# Patient Record
Sex: FEMALE | Race: WHITE | NOT HISPANIC OR LATINO | Employment: OTHER | URBAN - METROPOLITAN AREA
[De-identification: names, ages, dates, MRNs, and addresses within clinical notes are randomized per-mention and may not be internally consistent; named-entity substitution may affect disease eponyms.]

---

## 2017-01-16 ENCOUNTER — TELEPHONE (OUTPATIENT)
Dept: NEUROLOGY | Facility: CLINIC | Age: 77
End: 2017-01-16

## 2017-01-16 NOTE — TELEPHONE ENCOUNTER
----- Message from Mariana Fish sent at 1/16/2017  4:15 PM CST -----  Contact: Holley (Bestfriend) [phone]   Pt is calling to schedule recall appt, pls call

## 2017-01-17 NOTE — TELEPHONE ENCOUNTER
Called and spoke to patient son about setting up a follow up appt to see . We agreed on March 30 Thursday at 2:00 PM

## 2017-10-13 RX ORDER — ROPINIROLE 0.5 MG/1
TABLET, FILM COATED ORAL
Qty: 270 TABLET | Refills: 3 | Status: SHIPPED | OUTPATIENT
Start: 2017-10-13 | End: 2018-02-26 | Stop reason: SDUPTHER

## 2018-02-22 ENCOUNTER — PATIENT MESSAGE (OUTPATIENT)
Dept: NEUROLOGY | Facility: CLINIC | Age: 78
End: 2018-02-22

## 2018-02-26 RX ORDER — ROPINIROLE 0.5 MG/1
0.75 TABLET, FILM COATED ORAL 3 TIMES DAILY
Qty: 405 TABLET | Refills: 3 | Status: SHIPPED | OUTPATIENT
Start: 2018-02-26 | End: 2018-05-23 | Stop reason: SDUPTHER

## 2018-05-23 ENCOUNTER — OFFICE VISIT (OUTPATIENT)
Dept: NEUROLOGY | Facility: CLINIC | Age: 78
End: 2018-05-23
Payer: MEDICARE

## 2018-05-23 VITALS — DIASTOLIC BLOOD PRESSURE: 92 MMHG | HEIGHT: 66 IN | SYSTOLIC BLOOD PRESSURE: 198 MMHG | HEART RATE: 58 BPM

## 2018-05-23 DIAGNOSIS — G25.81 RLS (RESTLESS LEGS SYNDROME): Primary | ICD-10-CM

## 2018-05-23 DIAGNOSIS — I69.320 APHASIA DUE TO OLD CEREBRAL INFARCTION: ICD-10-CM

## 2018-05-23 DIAGNOSIS — G81.11 RIGHT SPASTIC HEMIPARESIS: ICD-10-CM

## 2018-05-23 PROCEDURE — 99214 OFFICE O/P EST MOD 30 MIN: CPT | Mod: S$PBB,,, | Performed by: PSYCHIATRY & NEUROLOGY

## 2018-05-23 PROCEDURE — 99213 OFFICE O/P EST LOW 20 MIN: CPT | Mod: PBBFAC | Performed by: PSYCHIATRY & NEUROLOGY

## 2018-05-23 PROCEDURE — 99999 PR PBB SHADOW E&M-EST. PATIENT-LVL III: CPT | Mod: PBBFAC,,, | Performed by: PSYCHIATRY & NEUROLOGY

## 2018-05-23 RX ORDER — ROPINIROLE 0.5 MG/1
1 TABLET, FILM COATED ORAL 4 TIMES DAILY
Qty: 360 TABLET | Refills: 3 | Status: SHIPPED | OUTPATIENT
Start: 2018-05-23 | End: 2018-12-06 | Stop reason: SDUPTHER
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
## 2018-05-23 NOTE — LETTER
May 25, 2018      Marisel Dobson MD  [facility] [address] [address]           [place] [address]  [place] [address]  Phone: [phone]  Fax: [phone]          Patient: Adelia Recinos   MR Number: [id number]   YOB: 1940   Date of Visit: 5/23/2018       Dear Dr. Marisel Dobson:    Thank you for referring Adelia Recinos to me for evaluation. Attached you will find relevant portions of my assessment and plan of care.    If you have questions, please do not hesitate to call me. I look forward to following Adelia Recinos along with you.    Sincerely,    Michelle Truong MD    Enclosure  CC:  No Recipients    If you would like to receive this communication electronically, please contact [email or web address] or [phone] to request more information on [employer] access.    For providers and/or their staff who would like to refer a patient to [facility], please contact us through our one-stop-shop provider referral line, [facility], at [phone].    If you feel you have received this communication in error or would no longer like to receive these types of communications, please e-mail [email or web address]

## 2018-05-25 NOTE — ASSESSMENT & PLAN NOTE
Slight worsening in symptoms since last seen, thus advised to increase requip.  Warned that this could cause hallucinations.

## 2018-10-12 ENCOUNTER — OFFICE VISIT (OUTPATIENT)
Dept: NEUROLOGY | Facility: CLINIC | Age: 78
End: 2018-10-12
Payer: MEDICARE

## 2018-10-12 VITALS
BODY MASS INDEX: 19.96 KG/M2 | DIASTOLIC BLOOD PRESSURE: 70 MMHG | HEIGHT: 66 IN | HEART RATE: 59 BPM | SYSTOLIC BLOOD PRESSURE: 152 MMHG

## 2018-10-12 DIAGNOSIS — H53.9 VISION CHANGES: ICD-10-CM

## 2018-10-12 DIAGNOSIS — R41.3 MEMORY LOSS: ICD-10-CM

## 2018-10-12 DIAGNOSIS — I69.320 APHASIA DUE TO OLD CEREBRAL INFARCTION: Primary | ICD-10-CM

## 2018-10-12 PROCEDURE — 99213 OFFICE O/P EST LOW 20 MIN: CPT | Mod: PBBFAC | Performed by: PSYCHIATRY & NEUROLOGY

## 2018-10-12 PROCEDURE — 99214 OFFICE O/P EST MOD 30 MIN: CPT | Mod: S$PBB,,, | Performed by: PSYCHIATRY & NEUROLOGY

## 2018-10-12 PROCEDURE — 99999 PR PBB SHADOW E&M-EST. PATIENT-LVL III: CPT | Mod: PBBFAC,,, | Performed by: PSYCHIATRY & NEUROLOGY

## 2018-10-12 NOTE — ASSESSMENT & PLAN NOTE
I'm unable to appreciate worsening in language, but tend to believe Holley Polanco over Adelia.   -> ST

## 2018-10-12 NOTE — LETTER
October 12, 2018      Marisel Dobson MD  [facility] [address] [address]           [place] [address]  [place] [address]  Phone: [phone]  Fax: [phone]          Patient: Adleia Recinos   MR Number: [id number]   YOB: 1940   Date of Visit: 10/12/2018       Dear Dr. Marisel Dobson:    Thank you for referring Adelia Recinos to me for evaluation. Attached you will find relevant portions of my assessment and plan of care.    If you have questions, please do not hesitate to call me. I look forward to following Adelia Recinos along with you.    Sincerely,    Michelle Truong MD    Enclosure  CC:  No Recipients    If you would like to receive this communication electronically, please contact [email or web address] or [phone] to request more information on [employer] Link access.    For providers and/or their staff who would like to refer a patient to [facility], please contact us through our one-stop-shop provider referral line, [facility], at [phone].    If you feel you have received this communication in error or would no longer like to receive these types of communications, please e-mail [email or web address]
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
## 2018-10-12 NOTE — ASSESSMENT & PLAN NOTE
Unable to perform MOCA due to aphasia and hemiparesis, but she endorses some loss of memory.   -> check b12, b1, tsh

## 2018-10-19 ENCOUNTER — PATIENT MESSAGE (OUTPATIENT)
Dept: NEUROLOGY | Facility: CLINIC | Age: 78
End: 2018-10-19

## 2018-10-23 ENCOUNTER — DOCUMENTATION ONLY (OUTPATIENT)
Dept: REHABILITATION | Facility: HOSPITAL | Age: 78
End: 2018-10-23

## 2018-10-23 NOTE — PROGRESS NOTES
No Show Note/Documentation    Patient: Adelia Recinos  Date of Session: 10/23/2018  MRN: [id number]    Adelia Recinos did not attend her  Scheduled speech and language evaluation today. She did not call to cancel nor reschedule. This is the 1st appointment that she has not attended. Will attempt to reschedule. No charges have been posted today.     ROSSANA Field, CCC-SLP  Speech Language Pathologist  10/23/2018

## 2018-10-26 ENCOUNTER — PATIENT MESSAGE (OUTPATIENT)
Dept: NEUROLOGY | Facility: CLINIC | Age: 78
End: 2018-10-26

## 2018-12-06 DIAGNOSIS — G25.81 RLS (RESTLESS LEGS SYNDROME): ICD-10-CM

## 2018-12-06 RX ORDER — ROPINIROLE 0.5 MG/1
1 TABLET, FILM COATED ORAL 4 TIMES DAILY
Qty: 360 TABLET | Refills: 3 | Status: SHIPPED | OUTPATIENT
Start: 2018-12-06 | End: 2019-10-17 | Stop reason: SDUPTHER

## 2019-03-14 ENCOUNTER — PATIENT MESSAGE (OUTPATIENT)
Dept: NEUROLOGY | Facility: CLINIC | Age: 79
End: 2019-03-14

## 2019-10-17 DIAGNOSIS — G25.81 RLS (RESTLESS LEGS SYNDROME): ICD-10-CM

## 2019-10-18 RX ORDER — ROPINIROLE 0.5 MG/1
1 TABLET, FILM COATED ORAL 4 TIMES DAILY
Qty: 360 TABLET | Refills: 3 | Status: SHIPPED | OUTPATIENT
Start: 2019-10-18 | End: 2020-05-12 | Stop reason: ALTCHOICE

## 2020-04-29 ENCOUNTER — PATIENT MESSAGE (OUTPATIENT)
Dept: NEUROLOGY | Facility: CLINIC | Age: 80
End: 2020-04-29

## 2020-05-12 ENCOUNTER — PATIENT MESSAGE (OUTPATIENT)
Dept: NEUROLOGY | Facility: CLINIC | Age: 80
End: 2020-05-12

## 2020-05-12 DIAGNOSIS — G81.11 RIGHT SPASTIC HEMIPARESIS: Primary | ICD-10-CM

## 2020-05-12 DIAGNOSIS — G25.81 RLS (RESTLESS LEGS SYNDROME): ICD-10-CM

## 2020-05-12 DIAGNOSIS — I69.320 APHASIA DUE TO OLD CEREBRAL INFARCTION: ICD-10-CM

## 2020-05-12 RX ORDER — DIAZEPAM ORAL 5 MG/5ML
1-2 SOLUTION ORAL EVERY 8 HOURS PRN
Qty: 180 ML | Refills: 5 | Status: SHIPPED | OUTPATIENT
Start: 2020-05-12 | End: 2020-06-11

## 2020-05-27 ENCOUNTER — PATIENT MESSAGE (OUTPATIENT)
Dept: NEUROLOGY | Facility: CLINIC | Age: 80
End: 2020-05-27

## 2020-06-12 ENCOUNTER — OFFICE VISIT (OUTPATIENT)
Dept: NEUROLOGY | Facility: CLINIC | Age: 80
End: 2020-06-12
Payer: MEDICARE

## 2020-06-12 DIAGNOSIS — G81.11 RIGHT SPASTIC HEMIPARESIS: ICD-10-CM

## 2020-06-12 DIAGNOSIS — I69.320 APHASIA DUE TO OLD CEREBRAL INFARCTION: Primary | ICD-10-CM

## 2020-06-12 PROCEDURE — 99213 OFFICE O/P EST LOW 20 MIN: CPT | Mod: 95,,, | Performed by: PSYCHIATRY & NEUROLOGY

## 2020-06-12 PROCEDURE — 99213 PR OFFICE/OUTPT VISIT, EST, LEVL III, 20-29 MIN: ICD-10-PCS | Mod: 95,,, | Performed by: PSYCHIATRY & NEUROLOGY

## 2020-06-12 NOTE — PROGRESS NOTES
Established Patient - Audio Only Telehealth Visit     The patient location is: Home  The chief complaint leading to consultation is: aphasia, rls, decline  Visit type: Virtual visit with audio only (telephone)  Total time spent with patient: 10 min       The reason for the audio only service rather than synchronous audio and video virtual visit was related to technical difficulties or patient preference/necessity.     Each patient to whom I provide medical services by telemedicine is:  (1) informed of the relationship between the physician and patient and the respective role of any other health care provider with respect to management of the patient; and (2) notified that they may decline to receive medical services by telemedicine and may withdraw from such care at any time. Patient verbally consented to receive this service via voice-only telephone call.       HPI:   Spoke with son, Dominic.  Health is declining, but pcp didn't feel like it was time for hospice.  She is slower, not talking as well.  She has 24hr sitters and care.  Unable to walk, but can transfer with assistance.  Needs help with feeding herself.  Intermittently has a day of moderate sedation.  May have had covid (son had it) about 2-3 months ago.  Not tested.    Does not seem to have discomfort.  RLS controlled.    Patient does not want life support or aggressive care.       Assessment and plan:    She was really ill a month ago and seemed to be actively dying, but she has stabilized on a new low functioning level.  It's possible she had stroke, but I suspect more likely that she had some sort of URI (covid or not) and has not been able to recover.    Discussed hospice with son, but he agrees that it would not be helpful or necessary at this time.                        This service was not originating from a related E/M service provided within the previous 7 days nor will  to an E/M service or procedure within the next 24 hours or my  soonest available appointment.  Prevailing standard of care was able to be met in this audio-only visit.

## 2020-07-01 ENCOUNTER — TELEPHONE (OUTPATIENT)
Dept: NEUROLOGY | Facility: CLINIC | Age: 80
End: 2020-07-01

## 2020-07-01 NOTE — TELEPHONE ENCOUNTER
Called and spoke to Dominic regarding his mother's appt with .    Rescheduled for July 9 Wednesday at 10:00AM   Vaccine Information Statement(s) was given today. This has been reviewed, questions answered, and verbal consent given by Patient for injection(s) and administration of Influenza (Inactivated).    1. Does the patient have a moderate to severe fever?  No  2. Has the patient had a serious reaction to a flu shot before?   No  3. Has the patient ever had Guillian [place] Syndrome within 6 weeks of a previous flu shot?  No  4. Is the patient less than 6 months of age?  No    Patient is eligible to receive the vaccine based on all questions being answered as 'No'.    Patient tolerated without incident. See immunization grid for documentation.         yes

## 2020-07-06 ENCOUNTER — PATIENT MESSAGE (OUTPATIENT)
Dept: NEUROLOGY | Facility: CLINIC | Age: 80
End: 2020-07-06

## 2020-07-09 ENCOUNTER — OFFICE VISIT (OUTPATIENT)
Dept: NEUROLOGY | Facility: CLINIC | Age: 80
End: 2020-07-09
Payer: MEDICARE

## 2020-07-09 DIAGNOSIS — G81.11 RIGHT SPASTIC HEMIPARESIS: ICD-10-CM

## 2020-07-09 DIAGNOSIS — G25.81 RLS (RESTLESS LEGS SYNDROME): ICD-10-CM

## 2020-07-09 DIAGNOSIS — I69.320 APHASIA DUE TO OLD CEREBRAL INFARCTION: Primary | ICD-10-CM

## 2020-07-09 DIAGNOSIS — R41.3 MEMORY LOSS: ICD-10-CM

## 2020-07-09 PROCEDURE — 99214 PR OFFICE/OUTPT VISIT, EST, LEVL IV, 30-39 MIN: ICD-10-PCS | Mod: 95,,, | Performed by: PSYCHIATRY & NEUROLOGY

## 2020-07-09 PROCEDURE — 99214 OFFICE O/P EST MOD 30 MIN: CPT | Mod: 95,,, | Performed by: PSYCHIATRY & NEUROLOGY

## 2020-07-09 NOTE — ASSESSMENT & PLAN NOTE
Controlled with requip.  While this may be causing sedation, son is reasonably reluctant to change as it has been very helpful.   -> consider neupro is swallowing become more problematic

## 2020-07-09 NOTE — ASSESSMENT & PLAN NOTE
Neuro symptoms had been static for >20 years, but she has had marked physical and mental decline in the past 6-12 months, most notably in past 3 months.  I suspect the viral illness in march is what pushed her to further decline.   Now mostly non-verbal and full care.   -> hospice referral
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
## 2020-07-10 ENCOUNTER — TELEPHONE (OUTPATIENT)
Dept: NEUROLOGY | Facility: CLINIC | Age: 80
End: 2020-07-10

## 2020-07-10 ENCOUNTER — PATIENT MESSAGE (OUTPATIENT)
Dept: NEUROLOGY | Facility: CLINIC | Age: 80
End: 2020-07-10

## 2020-07-10 NOTE — TELEPHONE ENCOUNTER
----- Message from Soledad Brown sent at 7/10/2020 11:00 AM CDT -----  Contact: [facility]  called to speak with the doctor concerning an order to be signed.     They would like a callback at [phone]    Thanks  KB

## 2020-07-13 ENCOUNTER — PATIENT MESSAGE (OUTPATIENT)
Dept: NEUROLOGY | Facility: CLINIC | Age: 80
End: 2020-07-13

## 2020-07-13 DIAGNOSIS — G25.81 RLS (RESTLESS LEGS SYNDROME): Primary | ICD-10-CM

## 2020-07-13 RX ORDER — HYDROCODONE BITARTRATE AND ACETAMINOPHEN 5; 325 MG/1; MG/1
1 TABLET ORAL
Qty: 30 TABLET | Refills: 0 | Status: SHIPPED | OUTPATIENT
Start: 2020-07-13

## 2020-07-14 ENCOUNTER — PATIENT MESSAGE (OUTPATIENT)
Dept: NEUROLOGY | Facility: CLINIC | Age: 80
End: 2020-07-14